# Patient Record
Sex: MALE | Race: BLACK OR AFRICAN AMERICAN | NOT HISPANIC OR LATINO | Employment: FULL TIME | URBAN - METROPOLITAN AREA
[De-identification: names, ages, dates, MRNs, and addresses within clinical notes are randomized per-mention and may not be internally consistent; named-entity substitution may affect disease eponyms.]

---

## 2023-11-24 ENCOUNTER — OFFICE VISIT (OUTPATIENT)
Dept: OPHTHALMOLOGY | Facility: CLINIC | Age: 23
End: 2023-11-24
Payer: COMMERCIAL

## 2023-11-24 DIAGNOSIS — H18.603 KERATOCONUS OF BOTH EYES: Primary | ICD-10-CM

## 2023-11-24 DIAGNOSIS — H17.9 CORNEAL SCAR, LEFT EYE: ICD-10-CM

## 2023-11-24 DIAGNOSIS — H52.03 HYPEROPIA OF BOTH EYES: ICD-10-CM

## 2023-11-24 DIAGNOSIS — H52.213 IRREGULAR ASTIGMATISM OF BOTH EYES: ICD-10-CM

## 2023-11-24 LAB
KMAX (OD): 91.2 DIOPTERS
KMAX (OS): 101.7 DIOPTERS
PACH THINNEST (OD): 402 MICRONS
PACH THINNEST (OS): 367 MICRONS
POSTERIOR FLOAT (OD): 70 MICRONS
POSTERIOR FLOAT (OS): 81 MICRONS
SIMK FLAT (OD): 53.8 DIOPTERS
SIMK FLAT AXIS (OS): 63.6 DEGREES
SIMK STEEP (OD): 68.8 DIOPTERS
SIMK STEEP (OS): 88.4 DIOPTERS
SIMK STEEP AXIS (OD): 100 DEGREES
SIMK STEEP AXIS (OS): 86.8 DEGREES

## 2023-11-24 PROCEDURE — 92072 FITG C-LENS KERATOCONUS 1ST: CPT | Performed by: OPTOMETRIST

## 2023-11-24 PROCEDURE — 92025 CPTRIZED CORNEAL TOPOGRAPHY: CPT | Performed by: OPTOMETRIST

## 2023-11-24 PROCEDURE — 92015 DETERMINE REFRACTIVE STATE: CPT | Performed by: OPTOMETRIST

## 2023-11-24 PROCEDURE — 99203 OFFICE O/P NEW LOW 30 MIN: CPT | Performed by: OPTOMETRIST

## 2023-11-24 PROCEDURE — V2531 CONTACT LENS GAS PERMEABLE: HCPCS | Performed by: OPTOMETRIST

## 2023-11-24 ASSESSMENT — REFRACTION_CURRENTRX
OD_BASECURVE: 8.4
OS_BRAND: SYNERGEYES VS
OS_DIAMETER: 16.0
OD_SPHERE: -2.00
OS_BASECURVE: 8.4
OS_SPHERE: +0.00
OD_DIAMETER: 16.0
OS_SPHERE: -2.00
OS_ADDL_SPECS: 5500
OD_DIAMETER: 16.0
OS_DIAMETER: 16.0
OD_DIAMETER: 16.0
OS_BASECURVE: 8.4
OD_BASECURVE: 8.4
OD_BRAND: SYNERGEYES VS
OD_SPHERE: +6.50
OD_CYLINDER: SPHERE
OS_CYLINDER: SPHERE
OS_SPHERE: 6.6
OD_BRAND: SYNERGEYES VS
OS_BRAND: ZENLENS PROLATE
OD_BRAND: ZENLENS PROLATE
OD_SPHERE: +0.00
OS_BRAND: SYNERGEYES VS
OS_BASECURVE: 6.10
OD_BASECURVE: 6.40
OS_DIAMETER: 16.0

## 2023-11-24 ASSESSMENT — VISUAL ACUITY
VA_OR_OS_CURRENT_RX: 20/30-2
VA_OR_OD_CURRENT_RX: 20/40+1
OD_SC: 20/60
OD_SC+: -2
METHOD: SNELLEN - LINEAR
OS_SC: CF 1FT

## 2023-11-24 ASSESSMENT — TONOMETRY
OS_IOP_MMHG: 18
IOP_METHOD: GOLDMANN APPLANATION
OD_IOP_MMHG: 17

## 2023-11-24 ASSESSMENT — REFRACTION_MANIFEST
OS_AXIS: 180
OS_CYLINDER: -1.25
OD_CYLINDER: -1.25
OD_AXIS: 055
OS_SPHERE: -0.75
OD_SPHERE: +0.00

## 2023-11-24 ASSESSMENT — EXTERNAL EXAM - RIGHT EYE: OD_EXAM: NORMAL

## 2023-11-24 ASSESSMENT — SLIT LAMP EXAM - LIDS
COMMENTS: NORMAL
COMMENTS: NORMAL

## 2023-11-24 ASSESSMENT — EXTERNAL EXAM - LEFT EYE: OS_EXAM: NORMAL

## 2023-11-24 NOTE — PROGRESS NOTES
Assessment/Plan   Diagnoses and all orders for this visit:  Keratoconus of both eyes  -     Corneal Topography - OU - Both Eyes  - Educated patient and mother on nature of condition. KCN left eye (OS)>>right eye (OD). Mrx revealed BCVA 20/100 right eye (OD), 20/400 left eye (OS). Pt fit with SynergEyes VS lenses both eyes (OU). Lenses to be ordered after prior auth approved. When lenses arrive, call pt to schedule contact lens (CL) pu visit with I&R training.   - Discussed complications for KCN including hydrops. Educated on risk factors for hydrops and recommended minimal eye rubbing. If pt ever experiences severe, sudden pain and decreased vision, RTC immediately for assessment. Discussed crosslinking procedure. Crosslinking indicated right eye (OD)>>left eye (OS) in order to halt progression. Recommend that patient be evaluated for crosslinking, especially given age and risk of further progression. Patient and mother expressed understanding.   Hyperopia of both eyes  Irregular astigmatism of both eyes  Corneal scar, left eye

## 2023-11-24 NOTE — Clinical Note
Both eyes (OU) Contact Lens Order  Quantity: 1 Package: RGP Appointment needed? Yes Medically necessary? Yes Ship To: [facility] Additional instructions: get prior auth then order scleral lenses and return when in for long I&R

## 2024-01-05 ENCOUNTER — OFFICE VISIT (OUTPATIENT)
Dept: OPHTHALMOLOGY | Facility: CLINIC | Age: 24
End: 2024-01-05
Payer: COMMERCIAL

## 2024-01-05 DIAGNOSIS — H18.603 KERATOCONUS OF BOTH EYES: Primary | ICD-10-CM

## 2024-01-05 PROCEDURE — TAXCU SALES TAX CUYAHOGA COUNTY: Performed by: OPTOMETRIST

## 2024-01-05 PROCEDURE — 99070 (U160 LACRIPURE (U16): Performed by: OPTOMETRIST

## 2024-01-05 PROCEDURE — 99212 OFFICE O/P EST SF 10 MIN: CPT | Performed by: OPTOMETRIST

## 2024-01-05 RX ORDER — MULTIVITAMIN
TABLET ORAL
COMMUNITY
Start: 2017-01-27

## 2024-01-05 RX ORDER — IBUPROFEN 400 MG/1
400 TABLET ORAL EVERY 8 HOURS PRN
COMMUNITY
Start: 2013-01-19

## 2024-01-05 ASSESSMENT — CONF VISUAL FIELD
OS_SUPERIOR_NASAL_RESTRICTION: 0
OD_NORMAL: 1
OS_INFERIOR_NASAL_RESTRICTION: 0
OS_INFERIOR_TEMPORAL_RESTRICTION: 0
OD_INFERIOR_TEMPORAL_RESTRICTION: 0
OD_SUPERIOR_TEMPORAL_RESTRICTION: 0
OD_INFERIOR_NASAL_RESTRICTION: 0
OS_SUPERIOR_TEMPORAL_RESTRICTION: 0
OD_SUPERIOR_NASAL_RESTRICTION: 0
OS_NORMAL: 1

## 2024-01-05 ASSESSMENT — ENCOUNTER SYMPTOMS
MUSCULOSKELETAL NEGATIVE: 0
ENDOCRINE NEGATIVE: 0
CARDIOVASCULAR NEGATIVE: 0
NEUROLOGICAL NEGATIVE: 0
CONSTITUTIONAL NEGATIVE: 0
EYES NEGATIVE: 0
GASTROINTESTINAL NEGATIVE: 0
HEMATOLOGIC/LYMPHATIC NEGATIVE: 0
ALLERGIC/IMMUNOLOGIC NEGATIVE: 0
PSYCHIATRIC NEGATIVE: 0
RESPIRATORY NEGATIVE: 0

## 2024-01-05 ASSESSMENT — SLIT LAMP EXAM - LIDS
COMMENTS: NORMAL
COMMENTS: NORMAL

## 2024-01-05 ASSESSMENT — EXTERNAL EXAM - LEFT EYE: OS_EXAM: NORMAL

## 2024-01-05 ASSESSMENT — REFRACTION_CURRENTRX
OD_SPHERE: +6.50
OS_BRAND: SYNERGEYES VS
OD_BASECURVE: 8.4
OS_DIAMETER: 16.0
OS_SPHERE: +6.50
OD_DIAMETER: 16.0
OS_BASECURVE: 8.4
OD_BRAND: SYNERGEYES VS

## 2024-01-05 ASSESSMENT — VISUAL ACUITY
METHOD: SNELLEN - LINEAR
VA_OR_OD_CURRENT_RX: 20/30
OS_SC: 20/200
OD_SC: 20/80
VA_OR_OS_CURRENT_RX: 20/30

## 2024-01-05 ASSESSMENT — EXTERNAL EXAM - RIGHT EYE: OD_EXAM: NORMAL

## 2024-01-05 NOTE — PROGRESS NOTES
Assessment/Plan   Diagnoses and all orders for this visit:  Keratoconus of both eyes    Specialty Contact Lenses:  Specialty contact lenses were dispensed today for treatment of a medically indicated condition. The patient was educated on the proper care, handling, insertion and removal of the lenses.  The patient should follow the wearing time and return for follow-up as indicated, and call or come in to the office if the eye becomes red or painful after wearing the lenses.       LXT noted Left eye due to deprivation, explained intermittent diplopia may occur with correction    Follow up 2-3 weeks

## 2024-02-16 ENCOUNTER — TELEPHONE (OUTPATIENT)
Dept: OPHTHALMOLOGY | Facility: CLINIC | Age: 24
End: 2024-02-16
Payer: COMMERCIAL

## 2024-02-16 NOTE — TELEPHONE ENCOUNTER
Mom called to order a lens for Nash.  I can't locate any coverage for him.  I advised her to call [facility] and find out what vision plan they have.  I looked up under  and it states he has coverage under [employer].

## 2025-05-12 ENCOUNTER — APPOINTMENT (OUTPATIENT)
Dept: OPHTHALMOLOGY | Age: 25
End: 2025-05-12
Payer: MEDICARE

## 2025-05-12 DIAGNOSIS — H18.603 KERATOCONUS OF BOTH EYES: Primary | ICD-10-CM

## 2025-05-12 LAB
KMAX (OD): 81.4 DIOPTERS
KMAX (OS): 82.1 DIOPTERS
PACH THINNEST (OD): 395 MICRONS
PACH THINNEST (OS): 327 MICRONS
SIMK FLAT (OD): 54.1 DIOPTERS
SIMK FLAT (OS): 66.1 DIOPTERS
SIMK STEEP (OD): 67.9 DIOPTERS
SIMK STEEP (OS): 77.8 DIOPTERS

## 2025-05-12 PROCEDURE — 99214 OFFICE O/P EST MOD 30 MIN: CPT | Performed by: OPHTHALMOLOGY

## 2025-05-12 PROCEDURE — 92025 CPTRIZED CORNEAL TOPOGRAPHY: CPT | Performed by: OPHTHALMOLOGY

## 2025-05-12 RX ORDER — MOXIFLOXACIN 5 MG/ML
1 SOLUTION/ DROPS OPHTHALMIC 3 TIMES DAILY
OUTPATIENT
Start: 2025-05-12

## 2025-05-12 RX ORDER — TETRACAINE HYDROCHLORIDE 5 MG/ML
1 SOLUTION OPHTHALMIC ONCE
OUTPATIENT
Start: 2025-05-12 | End: 2025-05-12

## 2025-05-12 ASSESSMENT — EXTERNAL EXAM - RIGHT EYE: OD_EXAM: NORMAL

## 2025-05-12 ASSESSMENT — ENCOUNTER SYMPTOMS
HEMATOLOGIC/LYMPHATIC NEGATIVE: 0
PSYCHIATRIC NEGATIVE: 0
RESPIRATORY NEGATIVE: 0
GASTROINTESTINAL NEGATIVE: 0
ENDOCRINE NEGATIVE: 0
NEUROLOGICAL NEGATIVE: 0
CONSTITUTIONAL NEGATIVE: 0
ALLERGIC/IMMUNOLOGIC NEGATIVE: 0
CARDIOVASCULAR NEGATIVE: 0
MUSCULOSKELETAL NEGATIVE: 0
EYES NEGATIVE: 1

## 2025-05-12 ASSESSMENT — SLIT LAMP EXAM - LIDS
COMMENTS: NORMAL
COMMENTS: NORMAL

## 2025-05-12 ASSESSMENT — TONOMETRY
OD_IOP_MMHG: 21
IOP_METHOD: TONOPEN
OS_IOP_MMHG: 14

## 2025-05-12 ASSESSMENT — VISUAL ACUITY
OD_SC: 20/60
OS_PH_SC: 20/200
OS_SC: 20/400
METHOD: SNELLEN - LINEAR

## 2025-05-12 ASSESSMENT — EXTERNAL EXAM - LEFT EYE: OS_EXAM: NORMAL

## 2025-05-12 NOTE — PROGRESS NOTES
"Assessment/Plan   Diagnoses and all orders for this visit:  Keratoconus of both eyes  23 yo male here for KCN/cross linking eval. Saw [facility] in January 2024. Had Cross-linking done at [facility] plus late 2024 on OD only. Goes back to see them in June. Stopped wearing sclerals. OD lens had gotten cracked. Doesn't wear scleral OS. Sts feels uncomfortable and can't see well out of it. Vision OD is \"alright\" since CXL. Vision poor OS.       Explained all risks involved with a PK including but not limited to:  1. Risk of rejection   2. Need for steroid eye drops that may cause cataract/glaucoma  3. Need for corrective glasses/CLs postop  4. Consistent follow up visits  5. Need for more than one transplant in their lifetime  The patient understands and wishes to proceed     Patient is not tolerating CL, he is interested in corneal transplant    He does have Left Exo (told about the risk of double vision and that this won't be fixed with corneal transaplant)    Plan:  For DALK vs penetrating keratoplasty (PK) in 8/2025  "

## 2025-08-04 ENCOUNTER — ANESTHESIA EVENT (OUTPATIENT)
Dept: OPERATING ROOM | Facility: CLINIC | Age: 25
End: 2025-08-04
Payer: MEDICARE

## 2025-08-05 ENCOUNTER — HOSPITAL ENCOUNTER (OUTPATIENT)
Facility: CLINIC | Age: 25
Setting detail: OUTPATIENT SURGERY
Discharge: HOME | End: 2025-08-05
Attending: OPHTHALMOLOGY | Admitting: OPHTHALMOLOGY
Payer: MEDICARE

## 2025-08-05 ENCOUNTER — ANESTHESIA (OUTPATIENT)
Dept: OPERATING ROOM | Facility: CLINIC | Age: 25
End: 2025-08-05
Payer: MEDICARE

## 2025-08-05 VITALS
WEIGHT: 219.36 LBS | TEMPERATURE: 96.8 F | SYSTOLIC BLOOD PRESSURE: 127 MMHG | RESPIRATION RATE: 16 BRPM | HEART RATE: 85 BPM | OXYGEN SATURATION: 100 % | DIASTOLIC BLOOD PRESSURE: 81 MMHG | BODY MASS INDEX: 32.49 KG/M2 | HEIGHT: 69 IN

## 2025-08-05 DIAGNOSIS — H17.9 CORNEAL SCAR, LEFT EYE: Primary | ICD-10-CM

## 2025-08-05 DIAGNOSIS — H18.603 KERATOCONUS OF BOTH EYES: ICD-10-CM

## 2025-08-05 PROCEDURE — 3600000008 HC OR TIME - EACH INCREMENTAL 1 MINUTE - PROCEDURE LEVEL THREE: Performed by: OPHTHALMOLOGY

## 2025-08-05 PROCEDURE — 2500000001 HC RX 250 WO HCPCS SELF ADMINISTERED DRUGS (ALT 637 FOR MEDICARE OP)

## 2025-08-05 PROCEDURE — 2500000004 HC RX 250 GENERAL PHARMACY W/ HCPCS (ALT 636 FOR OP/ED): Performed by: OPHTHALMOLOGY

## 2025-08-05 PROCEDURE — 65750 CORNEAL TRANSPLANT: CPT | Performed by: OPHTHALMOLOGY

## 2025-08-05 PROCEDURE — 87102 FUNGUS ISOLATION CULTURE: CPT | Performed by: OPHTHALMOLOGY

## 2025-08-05 PROCEDURE — 2780000003 HC OR 278 NO HCPCS: Performed by: OPHTHALMOLOGY

## 2025-08-05 PROCEDURE — 3700000001 HC GENERAL ANESTHESIA TIME - INITIAL BASE CHARGE: Performed by: OPHTHALMOLOGY

## 2025-08-05 PROCEDURE — 3600000003 HC OR TIME - INITIAL BASE CHARGE - PROCEDURE LEVEL THREE: Performed by: OPHTHALMOLOGY

## 2025-08-05 PROCEDURE — 7100000002 HC RECOVERY ROOM TIME - EACH INCREMENTAL 1 MINUTE: Performed by: OPHTHALMOLOGY

## 2025-08-05 PROCEDURE — 7100000001 HC RECOVERY ROOM TIME - INITIAL BASE CHARGE: Performed by: OPHTHALMOLOGY

## 2025-08-05 PROCEDURE — A65750 PR CORNEAL TRANSPLANT,PENETRAT,APHAKIA: Performed by: STUDENT IN AN ORGANIZED HEALTH CARE EDUCATION/TRAINING PROGRAM

## 2025-08-05 PROCEDURE — V2785 CORNEAL TISSUE PROCESSING: HCPCS | Performed by: OPHTHALMOLOGY

## 2025-08-05 PROCEDURE — 87181 SC STD AGAR DILUTION PER AGT: CPT | Performed by: OPHTHALMOLOGY

## 2025-08-05 PROCEDURE — 2500000004 HC RX 250 GENERAL PHARMACY W/ HCPCS (ALT 636 FOR OP/ED): Performed by: NURSE ANESTHETIST, CERTIFIED REGISTERED

## 2025-08-05 PROCEDURE — 2720000007 HC OR 272 NO HCPCS: Performed by: OPHTHALMOLOGY

## 2025-08-05 PROCEDURE — 3700000002 HC GENERAL ANESTHESIA TIME - EACH INCREMENTAL 1 MINUTE: Performed by: OPHTHALMOLOGY

## 2025-08-05 PROCEDURE — 2500000004 HC RX 250 GENERAL PHARMACY W/ HCPCS (ALT 636 FOR OP/ED)

## 2025-08-05 PROCEDURE — 7100000009 HC PHASE TWO TIME - INITIAL BASE CHARGE: Performed by: OPHTHALMOLOGY

## 2025-08-05 PROCEDURE — 2500000005 HC RX 250 GENERAL PHARMACY W/O HCPCS: Performed by: OPHTHALMOLOGY

## 2025-08-05 PROCEDURE — 7100000010 HC PHASE TWO TIME - EACH INCREMENTAL 1 MINUTE: Performed by: OPHTHALMOLOGY

## 2025-08-05 PROCEDURE — A65750 PR CORNEAL TRANSPLANT,PENETRAT,APHAKIA: Performed by: NURSE ANESTHETIST, CERTIFIED REGISTERED

## 2025-08-05 DEVICE — IMPLANTABLE DEVICE: Type: IMPLANTABLE DEVICE | Site: CORNEA | Status: FUNCTIONAL

## 2025-08-05 RX ORDER — PREDNISOLONE ACETATE 10 MG/ML
1 SUSPENSION/ DROPS OPHTHALMIC
Qty: 10 ML | Refills: 3 | Status: SHIPPED | OUTPATIENT
Start: 2025-08-05

## 2025-08-05 RX ORDER — LABETALOL HYDROCHLORIDE 5 MG/ML
5 INJECTION, SOLUTION INTRAVENOUS ONCE AS NEEDED
Status: DISCONTINUED | OUTPATIENT
Start: 2025-08-05 | End: 2025-08-05 | Stop reason: HOSPADM

## 2025-08-05 RX ORDER — TETRACAINE HYDROCHLORIDE 5 MG/ML
SOLUTION OPHTHALMIC AS NEEDED
Status: DISCONTINUED | OUTPATIENT
Start: 2025-08-05 | End: 2025-08-05 | Stop reason: HOSPADM

## 2025-08-05 RX ORDER — LIDOCAINE HCL/PF 100 MG/5ML
SYRINGE (ML) INTRAVENOUS AS NEEDED
Status: DISCONTINUED | OUTPATIENT
Start: 2025-08-05 | End: 2025-08-05

## 2025-08-05 RX ORDER — ACETAMINOPHEN 10 MG/ML
INJECTION, SOLUTION INTRAVENOUS AS NEEDED
Status: DISCONTINUED | OUTPATIENT
Start: 2025-08-05 | End: 2025-08-05

## 2025-08-05 RX ORDER — OXYCODONE HYDROCHLORIDE 5 MG/1
5 TABLET ORAL EVERY 4 HOURS PRN
Status: DISCONTINUED | OUTPATIENT
Start: 2025-08-05 | End: 2025-08-05 | Stop reason: HOSPADM

## 2025-08-05 RX ORDER — HYDRALAZINE HYDROCHLORIDE 20 MG/ML
5 INJECTION INTRAMUSCULAR; INTRAVENOUS EVERY 30 MIN PRN
Status: DISCONTINUED | OUTPATIENT
Start: 2025-08-05 | End: 2025-08-05 | Stop reason: HOSPADM

## 2025-08-05 RX ORDER — TETRACAINE HYDROCHLORIDE 5 MG/ML
1 SOLUTION OPHTHALMIC ONCE
Status: COMPLETED | OUTPATIENT
Start: 2025-08-05 | End: 2025-08-05

## 2025-08-05 RX ORDER — MOXIFLOXACIN 5 MG/ML
1 SOLUTION/ DROPS OPHTHALMIC 3 TIMES DAILY
Status: DISCONTINUED | OUTPATIENT
Start: 2025-08-05 | End: 2025-08-05 | Stop reason: HOSPADM

## 2025-08-05 RX ORDER — MOXIFLOXACIN 5 MG/ML
1 SOLUTION/ DROPS OPHTHALMIC 4 TIMES DAILY
Qty: 5 ML | Refills: 1 | Status: SHIPPED | OUTPATIENT
Start: 2025-08-05

## 2025-08-05 RX ORDER — HYDROCODONE BITARTRATE AND ACETAMINOPHEN 5; 325 MG/1; MG/1
1 TABLET ORAL EVERY 6 HOURS PRN
Qty: 6 TABLET | Refills: 0 | Status: SHIPPED | OUTPATIENT
Start: 2025-08-05 | End: 2025-08-07

## 2025-08-05 RX ORDER — DEXAMETHASONE SODIUM PHOSPHATE 10 MG/ML
INJECTION INTRAMUSCULAR; INTRAVENOUS AS NEEDED
Status: DISCONTINUED | OUTPATIENT
Start: 2025-08-05 | End: 2025-08-05 | Stop reason: HOSPADM

## 2025-08-05 RX ORDER — PROCHLORPERAZINE EDISYLATE 5 MG/ML
5 INJECTION INTRAMUSCULAR; INTRAVENOUS ONCE AS NEEDED
Status: DISCONTINUED | OUTPATIENT
Start: 2025-08-05 | End: 2025-08-05 | Stop reason: HOSPADM

## 2025-08-05 RX ORDER — PROPOFOL 10 MG/ML
INJECTION, EMULSION INTRAVENOUS AS NEEDED
Status: DISCONTINUED | OUTPATIENT
Start: 2025-08-05 | End: 2025-08-05

## 2025-08-05 RX ORDER — OXYCODONE HYDROCHLORIDE 5 MG/1
10 TABLET ORAL EVERY 4 HOURS PRN
Status: DISCONTINUED | OUTPATIENT
Start: 2025-08-05 | End: 2025-08-05 | Stop reason: HOSPADM

## 2025-08-05 RX ORDER — ACETAMINOPHEN 325 MG/1
650 TABLET ORAL EVERY 4 HOURS PRN
Status: DISCONTINUED | OUTPATIENT
Start: 2025-08-05 | End: 2025-08-05 | Stop reason: HOSPADM

## 2025-08-05 RX ORDER — SODIUM CHLORIDE, SODIUM LACTATE, POTASSIUM CHLORIDE, CALCIUM CHLORIDE 600; 310; 30; 20 MG/100ML; MG/100ML; MG/100ML; MG/100ML
100 INJECTION, SOLUTION INTRAVENOUS CONTINUOUS
Status: DISCONTINUED | OUTPATIENT
Start: 2025-08-05 | End: 2025-08-05 | Stop reason: HOSPADM

## 2025-08-05 RX ORDER — ROCURONIUM BROMIDE 10 MG/ML
INJECTION, SOLUTION INTRAVENOUS AS NEEDED
Status: DISCONTINUED | OUTPATIENT
Start: 2025-08-05 | End: 2025-08-05

## 2025-08-05 RX ORDER — HYDROMORPHONE HYDROCHLORIDE 1 MG/ML
0.25 INJECTION, SOLUTION INTRAMUSCULAR; INTRAVENOUS; SUBCUTANEOUS EVERY 5 MIN PRN
Status: DISCONTINUED | OUTPATIENT
Start: 2025-08-05 | End: 2025-08-05 | Stop reason: HOSPADM

## 2025-08-05 RX ORDER — LIDOCAINE HYDROCHLORIDE 10 MG/ML
0.1 INJECTION, SOLUTION EPIDURAL; INFILTRATION; INTRACAUDAL; PERINEURAL ONCE
Status: DISCONTINUED | OUTPATIENT
Start: 2025-08-05 | End: 2025-08-05 | Stop reason: HOSPADM

## 2025-08-05 RX ORDER — MIDAZOLAM HYDROCHLORIDE 1 MG/ML
INJECTION, SOLUTION INTRAMUSCULAR; INTRAVENOUS AS NEEDED
Status: DISCONTINUED | OUTPATIENT
Start: 2025-08-05 | End: 2025-08-05

## 2025-08-05 RX ORDER — SODIUM CHLORIDE, SODIUM LACTATE, POTASSIUM CHLORIDE, CALCIUM CHLORIDE 600; 310; 30; 20 MG/100ML; MG/100ML; MG/100ML; MG/100ML
INJECTION, SOLUTION INTRAVENOUS CONTINUOUS PRN
Status: DISCONTINUED | OUTPATIENT
Start: 2025-08-05 | End: 2025-08-05

## 2025-08-05 RX ORDER — FENTANYL CITRATE 50 UG/ML
INJECTION, SOLUTION INTRAMUSCULAR; INTRAVENOUS AS NEEDED
Status: DISCONTINUED | OUTPATIENT
Start: 2025-08-05 | End: 2025-08-05

## 2025-08-05 RX ORDER — ONDANSETRON HYDROCHLORIDE 2 MG/ML
4 INJECTION, SOLUTION INTRAVENOUS ONCE AS NEEDED
Status: DISCONTINUED | OUTPATIENT
Start: 2025-08-05 | End: 2025-08-05 | Stop reason: HOSPADM

## 2025-08-05 RX ORDER — POVIDONE-IODINE 5 %
SOLUTION, NON-ORAL OPHTHALMIC (EYE) AS NEEDED
Status: DISCONTINUED | OUTPATIENT
Start: 2025-08-05 | End: 2025-08-05 | Stop reason: HOSPADM

## 2025-08-05 RX ORDER — CEFAZOLIN 1 G/1
INJECTION, POWDER, FOR SOLUTION INTRAVENOUS AS NEEDED
Status: DISCONTINUED | OUTPATIENT
Start: 2025-08-05 | End: 2025-08-05 | Stop reason: HOSPADM

## 2025-08-05 RX ORDER — ACETAZOLAMIDE 250 MG/1
500 TABLET ORAL ONCE
Status: COMPLETED | OUTPATIENT
Start: 2025-08-05 | End: 2025-08-05

## 2025-08-05 RX ORDER — ONDANSETRON HYDROCHLORIDE 2 MG/ML
INJECTION, SOLUTION INTRAVENOUS AS NEEDED
Status: DISCONTINUED | OUTPATIENT
Start: 2025-08-05 | End: 2025-08-05

## 2025-08-05 RX ORDER — ALBUTEROL SULFATE 0.83 MG/ML
2.5 SOLUTION RESPIRATORY (INHALATION) ONCE AS NEEDED
Status: DISCONTINUED | OUTPATIENT
Start: 2025-08-05 | End: 2025-08-05 | Stop reason: HOSPADM

## 2025-08-05 RX ORDER — HYDROMORPHONE HYDROCHLORIDE 1 MG/ML
0.5 INJECTION, SOLUTION INTRAMUSCULAR; INTRAVENOUS; SUBCUTANEOUS EVERY 5 MIN PRN
Status: DISCONTINUED | OUTPATIENT
Start: 2025-08-05 | End: 2025-08-05 | Stop reason: HOSPADM

## 2025-08-05 RX ADMIN — MOXIFLOXACIN HYDROCHLORIDE 1 DROP: 5 SOLUTION/ DROPS OPHTHALMIC at 07:43

## 2025-08-05 RX ADMIN — ROCURONIUM BROMIDE 50 MG: 50 INJECTION, SOLUTION INTRAVENOUS at 08:39

## 2025-08-05 RX ADMIN — HYDROMORPHONE HYDROCHLORIDE 0.5 MG: 1 INJECTION, SOLUTION INTRAMUSCULAR; INTRAVENOUS; SUBCUTANEOUS at 12:00

## 2025-08-05 RX ADMIN — TETRACAINE HYDROCHLORIDE 1 DROP: 5 SOLUTION OPHTHALMIC at 07:36

## 2025-08-05 RX ADMIN — DEXAMETHASONE SODIUM PHOSPHATE 4 MG: 4 INJECTION, SOLUTION INTRA-ARTICULAR; INTRALESIONAL; INTRAMUSCULAR; INTRAVENOUS; SOFT TISSUE at 09:11

## 2025-08-05 RX ADMIN — PROPOFOL 200 MG: 10 INJECTION, EMULSION INTRAVENOUS at 08:38

## 2025-08-05 RX ADMIN — OXYCODONE HYDROCHLORIDE 5 MG: 5 TABLET ORAL at 12:15

## 2025-08-05 RX ADMIN — SODIUM CHLORIDE, POTASSIUM CHLORIDE, SODIUM LACTATE AND CALCIUM CHLORIDE: 600; 310; 30; 20 INJECTION, SOLUTION INTRAVENOUS at 08:29

## 2025-08-05 RX ADMIN — ROCURONIUM BROMIDE 10 MG: 50 INJECTION, SOLUTION INTRAVENOUS at 10:13

## 2025-08-05 RX ADMIN — HYDROMORPHONE HYDROCHLORIDE 0.5 MG: 1 INJECTION, SOLUTION INTRAMUSCULAR; INTRAVENOUS; SUBCUTANEOUS at 11:45

## 2025-08-05 RX ADMIN — LIDOCAINE HYDROCHLORIDE 100 MG: 20 INJECTION INTRAVENOUS at 08:37

## 2025-08-05 RX ADMIN — ACETAZOLAMIDE 500 MG: 250 TABLET ORAL at 12:15

## 2025-08-05 RX ADMIN — ACETAMINOPHEN 1000 MG: 10 INJECTION, SOLUTION INTRAVENOUS at 10:58

## 2025-08-05 RX ADMIN — FENTANYL CITRATE 50 MCG: 50 INJECTION, SOLUTION INTRAMUSCULAR; INTRAVENOUS at 08:37

## 2025-08-05 RX ADMIN — ONDANSETRON 4 MG: 2 INJECTION INTRAMUSCULAR; INTRAVENOUS at 10:10

## 2025-08-05 RX ADMIN — SUGAMMADEX 200 MG: 100 INJECTION, SOLUTION INTRAVENOUS at 11:09

## 2025-08-05 RX ADMIN — FENTANYL CITRATE 50 MCG: 50 INJECTION, SOLUTION INTRAMUSCULAR; INTRAVENOUS at 08:36

## 2025-08-05 RX ADMIN — MIDAZOLAM 2 MG: 1 INJECTION INTRAMUSCULAR; INTRAVENOUS at 08:29

## 2025-08-05 ASSESSMENT — COLUMBIA-SUICIDE SEVERITY RATING SCALE - C-SSRS
1. IN THE PAST MONTH, HAVE YOU WISHED YOU WERE DEAD OR WISHED YOU COULD GO TO SLEEP AND NOT WAKE UP?: NO
2. HAVE YOU ACTUALLY HAD ANY THOUGHTS OF KILLING YOURSELF?: NO
6. HAVE YOU EVER DONE ANYTHING, STARTED TO DO ANYTHING, OR PREPARED TO DO ANYTHING TO END YOUR LIFE?: NO

## 2025-08-05 ASSESSMENT — PAIN DESCRIPTION - DESCRIPTORS
DESCRIPTORS: SHARP

## 2025-08-05 ASSESSMENT — PAIN - FUNCTIONAL ASSESSMENT
PAIN_FUNCTIONAL_ASSESSMENT: 0-10
PAIN_FUNCTIONAL_ASSESSMENT: UNABLE TO SELF-REPORT

## 2025-08-05 ASSESSMENT — PAIN SCALES - GENERAL
PAINLEVEL_OUTOF10: 5 - MODERATE PAIN
PAINLEVEL_OUTOF10: 4
PAINLEVEL_OUTOF10: 0 - NO PAIN
PAINLEVEL_OUTOF10: 10 - WORST POSSIBLE PAIN
PAINLEVEL_OUTOF10: 6
PAINLEVEL_OUTOF10: 4
PAINLEVEL_OUTOF10: 10 - WORST POSSIBLE PAIN
PAINLEVEL_OUTOF10: 10 - WORST POSSIBLE PAIN

## 2025-08-05 ASSESSMENT — PAIN DESCRIPTION - ORIENTATION: ORIENTATION: LEFT

## 2025-08-05 ASSESSMENT — PAIN DESCRIPTION - LOCATION: LOCATION: EYE

## 2025-08-05 NOTE — H&P
"History Of Present Illness  Nash Kellogg is a 24 y.o. male presenting with left eye keratoconus.     Past Medical History  Medical History[1]    Surgical History  Surgical History[2]     Social History  He reports that he has never smoked. He has never used smokeless tobacco. He reports that he does not drink alcohol and does not use drugs.    Family History  Family History[3]     Allergies  Penicillins    Review of Systems  No new flashes, floaters, eye pain, sudden vision loss, double vision, redness, or drainage.       Physical Exam  HENT:      Head: Normocephalic and atraumatic.   Cardiovascular:      Pulses: Warm and well perfused  Pulmonary:      Effort: Pulmonary effort is normal.   Abdominal:      General: Abdomen is flat.      Palpations: Abdomen is soft.   Musculoskeletal:         General: No deformity.   Skin:     General: Skin is warm and dry.   Neurological:      General: No focal deficit present.      Mental Status: He is alert and oriented to person, place, and time.   Psychiatric:         Mood and Affect: Mood normal.      Last Recorded Vitals  Height 1.753 m (5' 9\"), weight 86.2 kg (190 lb).    Relevant Results      No current facility-administered medications on file prior to encounter.     No current outpatient medications on file prior to encounter.          Assessment & Plan  Keratoconus of both eyes      Plan:   - Deep anterior lamellar keratoplasty vs penetrating keratoplasty of left eye         Ginny Nielsen MD         [1]   Past Medical History:  Diagnosis Date    Keratoconus    [2]   Past Surgical History:  Procedure Laterality Date    LASIK Right 2024    Corneal Cross Linking    NO PAST SURGERIES     [3] No family history on file.    "

## 2025-08-05 NOTE — ANESTHESIA PROCEDURE NOTES
Airway  Date/Time: 8/5/2025 8:40 AM  Reason: elective    Airway not difficult    Staffing  Performed: CRNA   Authorized by: Sushil Jerez DO    Performed by: ANNETTE England-MCKINLEY  Patient location during procedure: OR    Patient Condition  Indications for airway management: anesthesia and airway protection  Patient position: sniffing  MILS maintained throughout  Planned trial extubation  Sedation level: no sedation     Final Airway Details   Preoxygenated: yes  Final airway type: endotracheal airway  Successful airway: ETT  Cuffed: yes   Successful intubation technique: video laryngoscopy  Adjuncts used in placement: intubating stylet  Endotracheal tube insertion site: oral  Blade size: #4  ETT size (mm): 7.5  Cormack-Lehane Classification: grade I - full view of glottis  Placement verified by: chest auscultation and capnometry   Measured from: lips  ETT to lips (cm): 23  Ventilation between attempts: none  Number of attempts at approach: 1  Number of other approaches attempted: 1

## 2025-08-05 NOTE — BRIEF OP NOTE
Date: 2025  [facility]    Name: Nash Kellogg : 2000, Age: 24 y.o., MRN: [id number], Sex: male    Diagnosis  Pre-op Diagnosis      * Keratoconus of both eyes [H18.603] Post-op Diagnosis     * Keratoconus of both eyes [H18.603]     Procedures  Keratoplasty Penetrating  21142 - [place] KERATOPLASTY PENETRAING APHAKIA      Surgeons      * Jayshree Barkley - Primary    Resident/Fellow/Other Assistant:  Surgeons and Role:  * No surgeons found with a matching role *    Staff:   Circulator: Elena Szymanski Person: Belkys Campos Circulator: Lauren    Anesthesia Staff: Anesthesiologist: Sushil Jerez DO  CRNA: ANNETTE England-MCKINLEY    Procedure Summary  Anesthesia: Monitor Anesthesia Care  ASA: I  Estimated Blood Loss: 3 mL  Intra-op Medications:   Administrations occurring from 0810 to 0950 on 25:   Medication Name Total Dose   povidone-iodine 5 % ophthalmic solution 1 Application   tetracaine (Altacaine) 0.5 % ophthalmic solution 1 drop   sodium hyaluronate (Provisc) intraocular injection 5 mg   balanced salts (BSS) intraocular solution 15 mL 30 mL   dexAMETHasone (Decadron) 4 mg/mL IV Syringe 2 mL 4 mg   fentaNYL (Sublimaze) injection 50 mcg/mL 100 mcg   LR infusion Cannot be calculated   lidocaine (cardiac) injection 2% prefilled syringe 100 mg   midazolam (Versed) injection 1 mg/mL 2 mg   propofol (Diprivan) injection 10 mg/mL 200 mg   rocuronium (ZeMuron) 50 mg/5 mL injection 50 mg              Anesthesia Record               Intraprocedure I/O Totals       None           Specimen:   ID Type Source Tests Collected by Time   1 : HOST CORNEA Tissue CORNEA LEFT SURGICAL PATHOLOGY EXAM Jayshree Barkley MD 2025 0908   A : DONOR TISSUE, Swab CORNEA LEFT FUNGAL CULTURE/SMEAR, TISSUE/WOUND CULTURE/SMEAR Jayshree Barkley MD 2025 0904                  Findings: corneal scar left eye    Complications:  None; patient tolerated the procedure well.     Disposition: PACU - hemodynamically  stable.  Condition: stable  Specimens Collected:   ID Type Source Tests Collected by Time   1 : HOST CORNEA Tissue CORNEA LEFT SURGICAL PATHOLOGY EXAM Jayshree Barkley MD 8/5/2025 0961   A : DONOR TISSUE, Swab CORNEA LEFT FUNGAL CULTURE/SMEAR, TISSUE/WOUND CULTURE/SMEAR Jayshree Barkley MD 8/5/2025 0904     Attending Attestation: I performed the procedure.    Jayshree Barkley  Phone Number: [phone]

## 2025-08-05 NOTE — OP NOTE
Keratoplasty Penetrating (L) Operative Note     Date: 2025  OR Location: [facility] OR    Name: Nash Kellogg, : 2000, Age: 24 y.o., MRN: [id number], Sex: male    Diagnosis  Pre-op Diagnosis      * Keratoconus of both eyes [H18.603] Post-op Diagnosis     * Keratoconus of both eyes [H18.603]     Procedures  Keratoplasty Penetrating  46955 - [place] KERATOPLASTY PENETRAING APHAKIA      Surgeons      * Jayshree Barkley - Primary    Resident/Fellow/Other Assistant:  Surgeons and Role:  * No surgeons found with a matching role *    Staff:   Circulator: Elena Szymanski Person: Belkys Campos Circulator: Lauren    Anesthesia Staff: Anesthesiologist: Sushil Jerez DO  CRNA: ANNETTE England-MCKINLEY    Procedure Summary  Anesthesia: Monitor Anesthesia Care  ASA: I  Estimated Blood Loss: minimal mL  Intra-op Medications:   Administrations occurring from 0810 to 0950 on 25:   Medication Name Total Dose   povidone-iodine 5 % ophthalmic solution 1 Application   tetracaine (Altacaine) 0.5 % ophthalmic solution 1 drop   sodium hyaluronate (Provisc) intraocular injection 5 mg   balanced salts (BSS) intraocular solution 15 mL 30 mL   dexAMETHasone (Decadron) 4 mg/mL IV Syringe 2 mL 4 mg   fentaNYL (Sublimaze) injection 50 mcg/mL 100 mcg   LR infusion Cannot be calculated   lidocaine (cardiac) injection 2% prefilled syringe 100 mg   midazolam (Versed) injection 1 mg/mL 2 mg   propofol (Diprivan) injection 10 mg/mL 200 mg   rocuronium (ZeMuron) 50 mg/5 mL injection 50 mg              Anesthesia Record               Intraprocedure I/O Totals          Intake    LR infusion 400.00 mL    Total Intake 400 mL          Specimen:   ID Type Source Tests Collected by Time   1 : HOST CORNEA Tissue CORNEA LEFT SURGICAL PATHOLOGY EXAM Jayshree Barkley MD 2025 0908   A : DONOR TISSUE, Swab CORNEA LEFT FUNGAL CULTURE/SMEAR, TISSUE/WOUND CULTURE/SMEAR Jayshree Barkley MD 2025 0904                 Drains and/or Catheters: * None  in log *    Tourniquet Times:         Implants:  Implants       Type Name Action Serial No.      Cornea [facility] EYE ORTIZ Implanted [id number]              Findings: keratoconus, corneal scar.    Indications: Nash Kellogg is an 24 y.o. male who is having surgery for Keratoconus of both eyes [H18.603].     The patient was seen in the preoperative area. The risks, benefits, complications, treatment options, non-operative alternatives, expected recovery and outcomes were discussed with the patient. The possibilities of reaction to medication, pulmonary aspiration, injury to surrounding structures, bleeding, recurrent infection, the need for additional procedures, failure to diagnose a condition, and creating a complication requiring transfusion or operation were discussed with the patient. The patient concurred with the proposed plan, giving informed consent.  The site of surgery was properly noted/marked if necessary per policy. The patient has been actively warmed in preoperative area. Preoperative antibiotics are not indicated. Venous thrombosis prophylaxis are not indicated.    Procedure Details: After obtaining informed consent, the patient was placed in the supine position on the operating room table where appropriate blood pressure and cardiac monitoring were initiated. The patient was placed under general anesthesia. The patient was prepped and draped in the usual sterile fashion for intraocular surgery. This included instillation of Betadine 5% onto the ocular surface followed by irrigation with balance salt solution.    A lid speculum was placed and the operating microscope was positioned.    There was significant corneal scarring. The cornea was examined again, and the optical axis was marked with a sterile marking pen in four quadrants after measuring 2mm from the limbus with calipers.     Attention was turned to the donor cornea. The donor cornea was trephined with an 7.75 mm trephine after  being placed in a donor corneal punch that had been marked with a marker in the punch holes. The cornea was placed in the punch endothelial side up. The donor rim was sent for culture on a culture plate. The donor button was covered with OptiSeal and placed aside.    Next, the host cornea was trephined centrally (based on markings) with an 7.50 mm trephine until the anterior chamber was entered.The edge of the trephine recipient cornea was lifted with a 0.12 forceps and corneal scissors were used to excise the recipient button.         Attention was turned toward placing the donor cornea. The anterior chamber was coated with Healon. The donor tissue was transferred onto the recipient bed atop the viscoelastic. The four interrupted 10-0 nylon cardinal sutures were placed first. The first stitch was placed with the assistance of Kevin forceps. 13 additional radial interrupted 10-0 nylon sutures were placed snugly. Care was taken to pass sutures in the donor stroma (not full thickness) and 90% depth into the host tissue.    The interrupted suture knots were buried on the donor side. The anterior chamber was reformed with BSS on a 30-gauge cannula, irrigating all of the Healon from the eye.    Subconjunctival antibiotic (Ancef) and steroid were given. The wound was checked with a Weck-Taylor sponge and fluorescein strip for leaks. At the end of the procedure, the wound was Gary negative. The lid speculum was removed. The drapes were gently removed. IV diamox and Solumedrol was given at the end of the case. Wet and dry sponges were used to clean the eye. A piece of 1 inch Transpore tape was used to close the eyelid. Next, a light eye patch was taped in place and covered with an eye shield. The patient was extubated without complications and taken to the PACU in good condition.    Evidence of Infection: No   Complications:  None; patient tolerated the procedure well.    Disposition: PACU - hemodynamically  stable.  Condition: stable                 Additional Details: 17 total sutures, one more inferotemporal.    Attending Attestation: I performed the procedure.      Jayshree Barkley  Phone Number: [phone]

## 2025-08-05 NOTE — DISCHARGE INSTRUCTIONS
Maintain patch and shield over the eye until your appointment tomorrow    You will start eyedrops tomorrow   Sleep with shield at night for 7 days  No eye rubbing  May shower and wash face but no water inside surgery eye  No lifting any weight above 10lbs  Patient to resume home medications.   Please call immediately if you develop any redness, pain, decreased vision, flashes, floaters.   Office phone (after hours): [phone]   Hospital : [phone] (call this number if unable to reach someone on the phone above) then ask for ophthalmologist on call.        Follow up tomorrow with Dr. Barkley at 9:30 AM at [facility].

## 2025-08-05 NOTE — ANESTHESIA PREPROCEDURE EVALUATION
Patient: Nash Kellogg    Procedure Information       Date/Time: 08/05/25 0810    Procedure: Keratoplasty Penetrating (Left)    Location: [facility] OR 03 / [facility]    Surgeons: Jayshree Barkley MD            Relevant Problems   No relevant active problems       Clinical information reviewed:   Tobacco  Allergies  Meds   Med Hx  Surg Hx   Fam Hx  Soc Hx        NPO Detail:  NPO/Void Status  Date of Last Liquid: 08/04/25  Time of Last Liquid: 1900  Date of Last Solid: 08/04/25  Time of Last Solid: 1900         Physical Exam    Airway  Mallampati: II  TM distance: >3 FB  Neck ROM: full  Mouth opening: 3 or more finger widths     Cardiovascular - normal exam   Dental    Pulmonary - normal exam   Abdominal            Anesthesia Plan    History of general anesthesia?: yes  History of complications of general anesthesia?: no    ASA 1     MAC     intravenous induction   Anesthetic plan and risks discussed with patient.  Use of blood products discussed with patient who.    Plan discussed with CRNA and attending.

## 2025-08-06 ENCOUNTER — APPOINTMENT (OUTPATIENT)
Dept: OPHTHALMOLOGY | Facility: CLINIC | Age: 25
End: 2025-08-06
Payer: MEDICARE

## 2025-08-06 DIAGNOSIS — Z94.7 STATUS POST PENETRATING KERATOPLASTY: Primary | ICD-10-CM

## 2025-08-06 PROCEDURE — 99024 POSTOP FOLLOW-UP VISIT: CPT | Performed by: OPHTHALMOLOGY

## 2025-08-06 ASSESSMENT — VISUAL ACUITY
OS_SC: 20/100
METHOD: SNELLEN - LINEAR

## 2025-08-06 ASSESSMENT — SLIT LAMP EXAM - LIDS: COMMENTS: NORMAL

## 2025-08-06 ASSESSMENT — TONOMETRY: OS_IOP_MMHG: 22

## 2025-08-06 NOTE — LETTER
August 7, 2025     Patient: Nash Kellogg   YOB: 2000   Date of Visit: 8/6/2025       To Whom It May Concern:    It is my medical opinion that Nash Kellogg should remain out of work until 8/25/2025.    If you have any questions or concerns, please don't hesitate to call.         Sincerely,        Jayshree Barkley MD    CC: No Recipients

## 2025-08-06 NOTE — ANESTHESIA POSTPROCEDURE EVALUATION
Patient: Nash Kellogg    Procedure Summary       Date: 08/05/25 Room / Location: [facility] OR 03 / [facility]    Anesthesia Start: 0829 Anesthesia Stop: 1124    Procedure: Keratoplasty Penetrating (Left: Eye) Diagnosis:       Keratoconus of both eyes      (Keratoconus of both eyes [H18.603])    Surgeons: Jayshree Barkley MD Responsible Provider: Sushil Jerez DO    Anesthesia Type: MAC ASA Status: 1            Anesthesia Type: MAC    Vitals Value Taken Time   /73 08/05/25 12:30   Temp 36 °C (96.8 °F) 08/05/25 11:22   Pulse 78 08/05/25 12:30   Resp 18 08/05/25 12:30   SpO2 98 % 08/05/25 12:30       Anesthesia Post Evaluation    Patient location during evaluation: PACU  Patient participation: complete - patient participated  Level of consciousness: awake and alert  Pain management: adequate  Airway patency: patent  Cardiovascular status: acceptable  Respiratory status: acceptable  Hydration status: acceptable  Postoperative Nausea and Vomiting: none        No notable events documented.

## 2025-08-07 ASSESSMENT — TONOMETRY: IOP_METHOD: TONOPEN

## 2025-08-07 NOTE — PROGRESS NOTES
Assessment/Plan   Diagnoses and all orders for this visit:  Status post penetrating keratoplasty    POD#1, doing well. BCL in place, all sutures intact. Normal intraocular pressure (IOP) deep anterior chamber (AC)    Plan:  Pred Q2hrs  Vigamox QID  Return precautions  See in 1 week.

## 2025-08-08 LAB
BACTERIA SPEC CULT: ABNORMAL
FUNGUS SPEC CULT: NORMAL
FUNGUS SPEC FUNGUS STN: NORMAL
GRAM STN SPEC: ABNORMAL
GRAM STN SPEC: ABNORMAL

## 2025-08-11 LAB
FUNGUS SPEC CULT: NORMAL
FUNGUS SPEC FUNGUS STN: NORMAL

## 2025-08-15 ENCOUNTER — APPOINTMENT (OUTPATIENT)
Dept: OPHTHALMOLOGY | Facility: CLINIC | Age: 25
End: 2025-08-15
Payer: MEDICARE

## 2025-08-15 DIAGNOSIS — Z94.7 STATUS POST PENETRATING KERATOPLASTY: Primary | ICD-10-CM

## 2025-08-15 PROCEDURE — 99024 POSTOP FOLLOW-UP VISIT: CPT | Performed by: OPHTHALMOLOGY

## 2025-08-15 ASSESSMENT — VISUAL ACUITY
METHOD: SNELLEN - LINEAR
OS_PH_SC: 20/70
OS_SC: 20/200

## 2025-08-15 ASSESSMENT — TONOMETRY
OS_IOP_MMHG: 17
IOP_METHOD: GOLDMANN APPLANATION

## 2025-08-15 ASSESSMENT — SLIT LAMP EXAM - LIDS: COMMENTS: NORMAL

## 2025-08-18 LAB
FUNGUS SPEC CULT: NORMAL
FUNGUS SPEC FUNGUS STN: NORMAL
LABORATORY COMMENT REPORT: NORMAL
PATH REPORT.FINAL DX SPEC: NORMAL
PATH REPORT.GROSS SPEC: NORMAL
PATH REPORT.RELEVANT HX SPEC: NORMAL
PATH REPORT.TOTAL CANCER: NORMAL

## 2025-08-25 LAB
FUNGUS SPEC CULT: NORMAL
FUNGUS SPEC FUNGUS STN: NORMAL

## 2025-09-03 ENCOUNTER — APPOINTMENT (OUTPATIENT)
Dept: OPHTHALMOLOGY | Facility: CLINIC | Age: 25
End: 2025-09-03
Payer: MEDICARE

## 2025-09-03 ASSESSMENT — VISUAL ACUITY
OS_SC+: +2
OS_SC: 20/100-1
METHOD: SNELLEN - LINEAR

## 2025-09-03 ASSESSMENT — TONOMETRY
IOP_METHOD: TONOPEN
OS_IOP_MMHG: 14

## 2025-09-03 ASSESSMENT — SLIT LAMP EXAM - LIDS: COMMENTS: NORMAL

## 2025-10-01 ENCOUNTER — APPOINTMENT (OUTPATIENT)
Dept: OPHTHALMOLOGY | Facility: CLINIC | Age: 25
End: 2025-10-01
Payer: MEDICARE

## (undated) DEVICE — SPEAR, EYE, SURGICAL, WECK-CELL, CELLULOSE

## (undated) DEVICE — SYRINGE, 1 CC, LUER LOCK

## (undated) DEVICE — SWAB, CULTURE, MINI-TIP, W/STUART LIQUID, SOFTWIRE

## (undated) DEVICE — SUTURE, NYLON, 10-0, 12 IN, CU5, DA, BLACK

## (undated) DEVICE — Device

## (undated) DEVICE — TOWEL, SURGICAL, NEURO, O/R, 16 X 26, BLUE, STERILE

## (undated) DEVICE — NEEDLE, HYPODERMIC, REGULAR WALL, REGULAR BEVEL, 30 G X 0.5 IN

## (undated) DEVICE — DRESSING, TRANSPARENT, TEGADERM, 2-3/8 X 2-3/4 IN

## (undated) DEVICE — KNIFE, OPHTHALMIC, CRESCENT, ANGLED, BEVEL UP, SATIN

## (undated) DEVICE — SYRINGE, MONOJECT, LUER LOCK, 3 CC, LF

## (undated) DEVICE — GLOVE, SURGICAL, PROTEXIS PI , 8.0, PF, LF

## (undated) DEVICE — NEEDLE, HYPODERMIC, REGULAR WALL, REGULAR BEVEL, 18 G X 1.5 IN